# Patient Record
Sex: FEMALE | Race: WHITE | NOT HISPANIC OR LATINO | URBAN - METROPOLITAN AREA
[De-identification: names, ages, dates, MRNs, and addresses within clinical notes are randomized per-mention and may not be internally consistent; named-entity substitution may affect disease eponyms.]

---

## 2021-09-28 ENCOUNTER — APPOINTMENT (RX ONLY)
Dept: URBAN - METROPOLITAN AREA CLINIC 117 | Facility: CLINIC | Age: 46
Setting detail: DERMATOLOGY
End: 2021-09-28

## 2021-09-28 DIAGNOSIS — C50 MALIGNANT NEOPLASM OF BREAST: ICD-10-CM

## 2021-09-28 PROBLEM — C50.919 MALIGNANT NEOPLASM OF UNSPECIFIED SITE OF UNSPECIFIED FEMALE BREAST: Status: ACTIVE | Noted: 2021-09-28

## 2021-09-28 PROCEDURE — ? CANCER SPECIALIST REFERRALS

## 2021-09-28 PROCEDURE — ? COUNSELING -  BREAST RECONSTRUCTION

## 2021-09-28 PROCEDURE — 99204 OFFICE O/P NEW MOD 45 MIN: CPT

## 2021-09-28 NOTE — PROCEDURE: COUNSELING -  BREAST RECONSTRUCTION
Additional Comments: I believe the patient is interested in having breast conservation therapy with lumpectomy and radiation.  She will consider her options and address any breast defects after her treatment is complete
Detail Level: Simple
Count Minor/Major Decisions Toward Mdm (Not Cosmetic)?: No

## 2021-09-28 NOTE — PROCEDURE: CANCER SPECIALIST REFERRALS
Detail Level: Zone
Text: We discussed treatment options including surgery, alternate types of surgery, surgery followed by radiation or chemoradiation, radiation alone, chemoradiation, or palliative chemotherapy. The patient understands there are various treatment options and will be referred to the above specialists.

## 2021-09-28 NOTE — HPI: BREAST RECONSTRUCTION CONSULTATION
Which Side(S)?: the left breast
What Is Your Pre-Diagnosis Bra Size (Numeric)?: [address]
How Severe Are Your Concerns?: moderate
Is This A New Presentation, Or A Follow-Up?: Breast Reconstruction Consultation
Who Referred You?: Dr Ilda Lomax
Additional History: Patient is ER+ [place]+HER2.\\nPossible chemotherapy or radiation depending if patient does a lumpectomy or a mastectomy